# Patient Record
Sex: MALE | Race: BLACK OR AFRICAN AMERICAN | Employment: OTHER | URBAN - METROPOLITAN AREA
[De-identification: names, ages, dates, MRNs, and addresses within clinical notes are randomized per-mention and may not be internally consistent; named-entity substitution may affect disease eponyms.]

---

## 2020-12-17 ENCOUNTER — OFFICE VISIT (OUTPATIENT)
Dept: GASTROENTEROLOGY | Age: 70
End: 2020-12-17
Payer: MEDICARE

## 2020-12-17 VITALS
BODY MASS INDEX: 35.84 KG/M2 | DIASTOLIC BLOOD PRESSURE: 80 MMHG | HEIGHT: 71 IN | RESPIRATION RATE: 16 BRPM | WEIGHT: 256 LBS | HEART RATE: 66 BPM | SYSTOLIC BLOOD PRESSURE: 140 MMHG | OXYGEN SATURATION: 98 %

## 2020-12-17 DIAGNOSIS — K62.5 RECTAL BLEEDING: ICD-10-CM

## 2020-12-17 DIAGNOSIS — Z86.19 H/O SEPSIS: ICD-10-CM

## 2020-12-17 DIAGNOSIS — E66.01 SEVERE OBESITY (HCC): ICD-10-CM

## 2020-12-17 DIAGNOSIS — K27.9 PEPTIC ULCER DISEASE: ICD-10-CM

## 2020-12-17 DIAGNOSIS — K64.1 GRADE II INTERNAL HEMORRHOIDS: ICD-10-CM

## 2020-12-17 DIAGNOSIS — Z87.438 HISTORY OF BPH: ICD-10-CM

## 2020-12-17 DIAGNOSIS — K62.5 RECTAL BLEEDING: Primary | ICD-10-CM

## 2020-12-17 PROBLEM — Z87.11 HISTORY OF STOMACH ULCERS: Status: ACTIVE | Noted: 2020-12-17

## 2020-12-17 PROBLEM — K64.9 HEMORRHOIDS: Status: ACTIVE | Noted: 2020-12-17

## 2020-12-17 PROBLEM — I10 HTN (HYPERTENSION): Status: ACTIVE | Noted: 2020-12-17

## 2020-12-17 PROCEDURE — 99204 OFFICE O/P NEW MOD 45 MIN: CPT | Performed by: INTERNAL MEDICINE

## 2020-12-17 RX ORDER — OMEPRAZOLE 40 MG/1
40 CAPSULE, DELAYED RELEASE ORAL DAILY
COMMUNITY

## 2020-12-17 RX ORDER — TADALAFIL 20 MG/1
TABLET ORAL
COMMUNITY
Start: 2020-10-15

## 2020-12-17 RX ORDER — GLUCOSAMINE SULFATE 1500 MG
1000 POWDER IN PACKET (EA) ORAL
COMMUNITY

## 2020-12-17 RX ORDER — HYDROCORTISONE 25 MG/G
CREAM TOPICAL
Qty: 30 G | Refills: 3 | Status: SHIPPED | OUTPATIENT
Start: 2020-12-17 | End: 2021-04-26 | Stop reason: SDUPTHER

## 2020-12-17 NOTE — PROGRESS NOTES
1. Have you been to the ER, urgent care clinic since your last visit? Hospitalized since your last visit? NO    2. Have you seen or consulted any other health care providers outside of the [address] [place] [address] since your last visit? Include any pap smears or colon screening.  NO.

## 2020-12-17 NOTE — PROGRESS NOTES
Nancy Wilcox is a 72 y.o. male who presents today for the following:  Chief Complaint   Patient presents with    Rectal Bleeding    Hemorrhoids         No Known Allergies    Current Outpatient Medications   Medication Sig    tadalafiL (CIALIS) 20 mg tablet TAKE ONE TABLET BY MOUTH ONCE DAILY AS NEEDED for ED    omeprazole (PRILOSEC) 40 mg capsule Take 40 mg by mouth daily.  hydrocortisone (ANUSOL-HC) 2.5 % rectal cream 1 application per rectum twice daily    GARLIC PO Take  by mouth daily as needed.  cholecalciferol (VITAMIN D3) 25 mcg (1,000 unit) cap Take 1,000 Units by mouth. No current facility-administered medications for this visit.         Past Medical History:   Diagnosis Date    H/O sepsis 12/17/2020    H/o sepsis due to UTI    Hemorrhoids 12/17/2020    History of BPH 12/17/2020    History of stomach ulcers 12/17/2020    HTN (hypertension) 12/17/2020    Rectal bleeding 12/17/2020       Past Surgical History:   Procedure Laterality Date    HX OTHER SURGICAL         Family History   Problem Relation Age of Onset    Stroke Mother     Prostate Cancer Mother     Heart Disease Mother        Social History     Socioeconomic History    Marital status:      Spouse name: Not on file    Number of children: Not on file    Years of education: Not on file    Highest education level: Not on file   Occupational History    Not on file   Social Needs    Financial resource strain: Not on file    Food insecurity     Worry: Not on file     Inability: Not on file    Transportation needs     Medical: Not on file     Non-medical: Not on file   Tobacco Use    Smoking status: Unknown If Ever Smoked   Substance and Sexual Activity    Alcohol use: Not on file    Drug use: Not on file    Sexual activity: Not on file   Lifestyle    Physical activity     Days per week: Not on file     Minutes per session: Not on file    Stress: Not on file   Relationships    Social connections     Talks on phone: Not on file     Gets together: Not on file     Attends Voodoo service: Not on file     Active member of club or organization: Not on file     Attends meetings of clubs or organizations: Not on file     Relationship status: Not on file    Intimate partner violence     Fear of current or ex partner: Not on file     Emotionally abused: Not on file     Physically abused: Not on file     Forced sexual activity: Not on file   Other Topics Concern    Not on file   Social History Narrative    Not on file         HPI  46-year-old male with history of hypertension, ulcer disease, BPH, and internal hemorrhoids who comes in for evaluation of rectal bleeding. Patient states he started to have rectal bleeding 1 week ago. He states his stools are hard prior to the episode and he did do some straining. Blood has been bright red blood and with every bowel movement since that time. He states it all ended with no bleeding noted today for the first time. Associated abdominal pain except mild pain across the lower abdomen x1 episode. States his bowel movements have been more constipated recently. Patient states in November 2020 he did have a prostate biopsy. He last had a colonoscopy approximately 1 year ago and told it was okay except for internal hemorrhoids. Patient states he may see a little blood on wiping at times even prior to this episode. Patient states that last evening he use some of his sisters hydrocortisone cream per rectum. He states it burns a little at first however he has not seen any blood since that time. Patient states he had blood work done PCP approximately 1 week ago was told his blood count was good. Dizziness or weakness. Patient states his primary care doctor he is at [facility] at the [facility] and his doctors is Dr. Denise Bobby. Review of Systems   Constitutional: Negative. HENT: Negative. Negative for nosebleeds. Eyes: Negative. Respiratory: Negative. Cardiovascular: Negative. Gastrointestinal: Positive for blood in stool and constipation. Negative for abdominal pain, diarrhea, heartburn, melena, nausea and vomiting. Genitourinary: Negative. Musculoskeletal: Negative. Skin: Negative. Neurological: Negative. Endo/Heme/Allergies: Negative. Psychiatric/Behavioral: Negative. All other systems reviewed and are negative. Visit Vitals  BP (!) 140/80 (BP 1 Location: Left arm, BP Patient Position: Sitting)   Pulse 66   Resp 16   Ht 5' 11\" (1.803 m)   Wt 116.1 kg (256 lb)   SpO2 98% Comment: room air   BMI 35.70 kg/m²     Physical Exam  Vitals signs and nursing note reviewed. Constitutional:       General: He is not in acute distress. Appearance: Normal appearance. He is obese. He is not ill-appearing. HENT:      Head: Normocephalic and atraumatic. Nose: Nose normal.      Mouth/Throat:      Mouth: Mucous membranes are moist.      Pharynx: Oropharynx is clear. Eyes:      General: No scleral icterus. Extraocular Movements: Extraocular movements intact. Conjunctiva/sclera: Conjunctivae normal.      Pupils: Pupils are equal, round, and reactive to light. Neck:      Musculoskeletal: Normal range of motion and neck supple. Cardiovascular:      Rate and Rhythm: Normal rate and regular rhythm. Heart sounds: Normal heart sounds. Pulmonary:      Effort: Pulmonary effort is normal.      Breath sounds: Normal breath sounds. Abdominal:      General: Bowel sounds are normal. There is no distension. Palpations: Abdomen is soft. There is no mass. Tenderness: There is no abdominal tenderness. There is no right CVA tenderness, left CVA tenderness, guarding or rebound. Hernia: No hernia is present. Musculoskeletal: Normal range of motion. Skin:     General: Skin is warm and dry. Coloration: Skin is not jaundiced. Neurological:      General: No focal deficit present.       Mental Status: He is alert and oriented to person, place, and time. Psychiatric:         Mood and Affect: Mood normal.         Behavior: Behavior normal.         Thought Content: Thought content normal.         Judgment: Judgment normal.            1. Rectal bleeding  . Secondary to internal hemorrhoids with prolapse and bleeding. We will continue patient on a hydrocortisone cream since he responded distal 1 dose borrowed from his sister. Advised to keep stools soft by use of a stool softener daily. - hydrocortisone (ANUSOL-HC) 2.5 % rectal cream; 1 application per rectum twice daily  Dispense: 30 g; Refill: 3    2. Severe obesity ([address]      3.  Grade IIl internal hemorrhoids

## 2021-03-07 ENCOUNTER — HOSPITAL ENCOUNTER (EMERGENCY)
Age: 71
Discharge: HOME | End: 2021-03-07
Attending: EMERGENCY MEDICINE
Payer: MEDICARE

## 2021-03-07 VITALS
OXYGEN SATURATION: 96 % | BODY MASS INDEX: 35.84 KG/M2 | TEMPERATURE: 99.2 F | HEIGHT: 71 IN | WEIGHT: 256 LBS | SYSTOLIC BLOOD PRESSURE: 156 MMHG | DIASTOLIC BLOOD PRESSURE: 94 MMHG | HEART RATE: 91 BPM | RESPIRATION RATE: 18 BRPM

## 2021-03-07 DIAGNOSIS — J02.9 ACUTE PHARYNGITIS, UNSPECIFIED ETIOLOGY: Primary | ICD-10-CM

## 2021-03-07 DIAGNOSIS — H61.21 IMPACTED CERUMEN OF RIGHT EAR: ICD-10-CM

## 2021-03-07 LAB — DEPRECATED S PYO AG THROAT QL EIA: NEGATIVE

## 2021-03-07 PROCEDURE — 87070 CULTURE OTHR SPECIMN AEROBIC: CPT

## 2021-03-07 PROCEDURE — 99283 EMERGENCY DEPT VISIT LOW MDM: CPT

## 2021-03-07 PROCEDURE — 87880 STREP A ASSAY W/OPTIC: CPT

## 2021-03-07 PROCEDURE — 74011250637 HC RX REV CODE- 250/637: Performed by: EMERGENCY MEDICINE

## 2021-03-07 RX ORDER — ONDANSETRON 4 MG/1
4 TABLET, ORALLY DISINTEGRATING ORAL
Qty: 6 TAB | Refills: 0 | Status: SHIPPED | OUTPATIENT
Start: 2021-03-07

## 2021-03-07 RX ORDER — AMOXICILLIN 500 MG/1
500 TABLET, FILM COATED ORAL 3 TIMES DAILY
Qty: 30 TAB | Refills: 0 | Status: SHIPPED | OUTPATIENT
Start: 2021-03-07 | End: 2021-03-17

## 2021-03-07 RX ORDER — AMOXICILLIN 250 MG/5ML
500 POWDER, FOR SUSPENSION ORAL
Status: COMPLETED | OUTPATIENT
Start: 2021-03-07 | End: 2021-03-07

## 2021-03-07 RX ADMIN — AMOXICILLIN 500 MG: 250 POWDER, FOR SUSPENSION ORAL at 08:24

## 2021-03-07 NOTE — ED PROVIDER NOTES
EMERGENCY DEPARTMENT HISTORY AND PHYSICAL EXAM  ?    Date: 3/7/2021  Patient Name: Jesu Magana    History of Presenting Illness    Patient presents with:  Ear Pain  Sore Throat      History Provided By: Patient    HPI: Jesu Magana, 72 y.o. male with a past medical history significant hypertension presents to the ED with cc of sore throat and fever for 2 days. He had Covid 1 week ago. He also has right earache. There are no other complaints, changes, or physical findings at this time. PCP: UNKNOWN    No current facility-administered medications on file prior to encounter. Current Outpatient Medications on File Prior to Encounter:    GARLIC PO, Take  by mouth daily as needed. , Disp: , Rfl:     cholecalciferol (VITAMIN D3) 25 mcg (1,000 unit) cap, Take 1,000 Units by mouth., Disp: , Rfl:     tadalafiL (CIALIS) 20 mg tablet, TAKE ONE TABLET BY MOUTH ONCE DAILY AS NEEDED for ED, Disp: , Rfl:     omeprazole (PRILOSEC) 40 mg capsule, Take 40 mg by mouth daily. , Disp: , Rfl:     hydrocortisone (ANUSOL-HC) 2.5 % rectal cream, 1 application per rectum twice daily, Disp: 30 g, Rfl: 3        Past History    Past Medical History:  Past Medical History:  12/17/2020: H/O sepsis     Comment:  H/o sepsis due to UTI  12/17/2020: Hemorrhoids  12/17/2020: History of BPH  12/17/2020:  History of stomach ulcers  12/17/2020: HTN (hypertension)  12/17/2020: Rectal bleeding    Past Surgical History:  Past Surgical History:  No date: HX OTHER SURGICAL    Family History:  Review of patient's family history indicates:  Problem: Stroke     Relation: Mother         Age of Onset: (Not Specified)  Problem: Prostate Cancer     Relation: Mother         Age of Onset: (Not Specified)  Problem: Heart Disease     Relation: Mother         Age of Onset: (Not Specified)      Social History:  Social History   Tobacco Use     Smoking status: Unknown If Ever Smoked   Alcohol use: Not on file   Drug use: Not on file      Allergies:  No Known Allergies      Review of Systems  @[facility]@    Physical Exam  [unfilled]    Diagnostic Study Results    Labs -  No results found for this or any previous visit (from the past 12 hour(s)).    Radiologic Studies -   No orders to display  CT Results  (Last 48 hours)   None     CXR Results  (Last 48 hours)   None         Medical Decision Making  I am the first provider for this patient.    I reviewed the vital signs, available nursing notes, past medical history, past surgical history, family history and social history.    Vital Signs-Reviewed the patient's vital signs.  Empty flowsheet group.      Records Reviewed: Nursing Notes    Provider Notes (Medical Decision Making):   Patient with recent Covid infection presents with odynophagia.  Will check strep test.  It is likely related to Covid.    ED Course:   Strep test is negative.    Initial assessment performed. The patients presenting problems have been discussed, and they are in agreement with the care plan formulated and outlined with them.  I have encouraged them to ask questions as they arise throughout their visit.              PLAN:  1. Current Discharge Medication List      2. Follow-up Information    None    Return to ED if worse     Diagnosis    Clinical Impression: Pharyngitis    ?           Past Medical History:   Diagnosis Date   • H/O sepsis 12/17/2020    H/o sepsis due to UTI   • Hemorrhoids 12/17/2020   • History of BPH 12/17/2020   • History of stomach ulcers 12/17/2020   • HTN (hypertension) 12/17/2020   • Rectal bleeding 12/17/2020       Past Surgical History:   Procedure Laterality Date   • HX OTHER SURGICAL           Family History:   Problem Relation Age of Onset   • Stroke Mother    • Prostate Cancer Mother    • Heart Disease Mother        Social History     Socioeconomic History   • Marital status:      Spouse name: Not on file   • Number of children: Not on file   • Years of education: Not on file   • Highest education level:  Not on file   Occupational History    Not on file   Social Needs    Financial resource strain: Not on file    Food insecurity     Worry: Not on file     Inability: Not on file    Transportation needs     Medical: Not on file     Non-medical: Not on file   Tobacco Use    Smoking status: Unknown If Ever Smoked   Substance and Sexual Activity    Alcohol use: Not on file    Drug use: Not on file    Sexual activity: Not on file   Lifestyle    Physical activity     Days per week: Not on file     Minutes per session: Not on file    Stress: Not on file   Relationships    Social connections     Talks on phone: Not on file     Gets together: Not on file     Attends Christian service: Not on file     Active member of club or organization: Not on file     Attends meetings of clubs or organizations: Not on file     Relationship status: Not on file    Intimate partner violence     Fear of current or ex partner: Not on file     Emotionally abused: Not on file     Physically abused: Not on file     Forced sexual activity: Not on file   Other Topics Concern    Not on file   Social History Narrative    Not on file         ALLERGIES: Patient has no known allergies. Review of Systems   Constitutional: Positive for fever. HENT: Positive for ear pain and sore throat. Eyes: Negative. Respiratory: Negative. Cardiovascular: Negative. Gastrointestinal: Negative. Endocrine: Negative. Genitourinary: Negative. Musculoskeletal: Negative. Allergic/Immunologic: Negative. Neurological: Negative. Hematological: Negative. Psychiatric/Behavioral: Negative. Vitals:    03/07/21 0614   BP: (!) 151/91   Pulse: 92   Resp: 16   Temp: 99.2 °F (37.3 °C)   SpO2: 97%   Weight: 116.1 kg (256 lb)   Height: 5' 11\" (1.803 m)            Physical Exam  Vitals signs and nursing note reviewed. Constitutional:       Appearance: Normal appearance. He is normal weight.    HENT:      Head: Normocephalic and atraumatic. Right Ear: External ear normal.      Left Ear: Tympanic membrane, ear canal and external ear normal.      Ears:      Comments: Cerumen impaction     Nose: Nose normal.      Mouth/Throat:      Mouth: Mucous membranes are moist.      Pharynx: Posterior oropharyngeal erythema present. Eyes:      Extraocular Movements: Extraocular movements intact. Conjunctiva/sclera: Conjunctivae normal.      Pupils: Pupils are equal, round, and reactive to light. Neck:      Musculoskeletal: Normal range of motion and neck supple. Cardiovascular:      Rate and Rhythm: Normal rate and regular rhythm. Pulses: Normal pulses. Heart sounds: Normal heart sounds. Pulmonary:      Effort: Pulmonary effort is normal.      Breath sounds: Normal breath sounds. Abdominal:      General: Abdomen is flat. Bowel sounds are normal.      Palpations: Abdomen is soft. Musculoskeletal: Normal range of motion. Skin:     General: Skin is warm and dry. Neurological:      General: No focal deficit present. Mental Status: He is alert and oriented to person, place, and time.    Psychiatric:         Mood and Affect: Mood normal.         Behavior: Behavior normal.          MDM  Number of Diagnoses or Management Options  Risk of Complications, Morbidity, and/or Mortality  Presenting problems: low  Diagnostic procedures: low  Management options: low    Patient Progress  Patient progress: stable         Procedures

## 2021-04-26 ENCOUNTER — OFFICE VISIT (OUTPATIENT)
Dept: GASTROENTEROLOGY | Age: 71
End: 2021-04-26
Payer: MEDICARE

## 2021-04-26 VITALS
SYSTOLIC BLOOD PRESSURE: 162 MMHG | HEART RATE: 67 BPM | BODY MASS INDEX: 35.43 KG/M2 | RESPIRATION RATE: 18 BRPM | DIASTOLIC BLOOD PRESSURE: 76 MMHG | WEIGHT: 254 LBS | TEMPERATURE: 98.1 F | OXYGEN SATURATION: 99 %

## 2021-04-26 DIAGNOSIS — Z87.11 HISTORY OF STOMACH ULCERS: ICD-10-CM

## 2021-04-26 DIAGNOSIS — K62.5 RECTAL BLEEDING: ICD-10-CM

## 2021-04-26 DIAGNOSIS — K64.2 GRADE III HEMORRHOIDS: Primary | ICD-10-CM

## 2021-04-26 PROBLEM — J30.9 CHRONIC ALLERGIC RHINITIS: Status: ACTIVE | Noted: 2017-10-31

## 2021-04-26 PROBLEM — N52.1 ERECTILE DYSFUNCTION DUE TO DISEASES CLASSIFIED ELSEWHERE: Status: ACTIVE | Noted: 2019-07-16

## 2021-04-26 PROBLEM — N40.0 BENIGN PROSTATIC HYPERPLASIA: Status: ACTIVE | Noted: 2017-03-10

## 2021-04-26 PROCEDURE — 1101F PT FALLS ASSESS-DOCD LE1/YR: CPT | Performed by: INTERNAL MEDICINE

## 2021-04-26 PROCEDURE — G8753 SYS BP > OR = 140: HCPCS | Performed by: INTERNAL MEDICINE

## 2021-04-26 PROCEDURE — 99214 OFFICE O/P EST MOD 30 MIN: CPT | Performed by: INTERNAL MEDICINE

## 2021-04-26 PROCEDURE — 3017F COLORECTAL CA SCREEN DOC REV: CPT | Performed by: INTERNAL MEDICINE

## 2021-04-26 PROCEDURE — G8536 NO DOC ELDER MAL SCRN: HCPCS | Performed by: INTERNAL MEDICINE

## 2021-04-26 PROCEDURE — G8510 SCR DEP NEG, NO PLAN REQD: HCPCS | Performed by: INTERNAL MEDICINE

## 2021-04-26 PROCEDURE — G8417 CALC BMI ABV UP PARAM F/U: HCPCS | Performed by: INTERNAL MEDICINE

## 2021-04-26 PROCEDURE — G8754 DIAS BP LESS 90: HCPCS | Performed by: INTERNAL MEDICINE

## 2021-04-26 PROCEDURE — G8427 DOCREV CUR MEDS BY ELIG CLIN: HCPCS | Performed by: INTERNAL MEDICINE

## 2021-04-26 RX ORDER — HYDROCORTISONE 25 MG/G
CREAM TOPICAL
Qty: 30 G | Refills: 3 | Status: SHIPPED | OUTPATIENT
Start: 2021-04-26

## 2021-04-26 RX ORDER — FLUTICASONE PROPIONATE 50 MCG
SPRAY, SUSPENSION (ML) NASAL
COMMUNITY
Start: 2021-03-04

## 2021-04-26 NOTE — PROGRESS NOTES
Franky Givens is a 80 y.o. male who presents today for the following:  Chief Complaint   Patient presents with    Follow-up    Hemorrhoids    Rectal Bleeding         No Known Allergies    Current Outpatient Medications   Medication Sig    fluticasone propionate (FLONASE) 50 mcg/actuation nasal spray SPRAY TWICE INTO EACH NOSTRIL DAILY RINSE MOUTH AFTER USE    hydrocortisone (ANUSOL-HC) 2.5 % rectal cream 1 application per rectum twice daily    ondansetron (Zofran ODT) 4 mg disintegrating tablet Take 1 Tab by mouth every eight (8) hours as needed for Nausea or Vomiting.  GARLIC PO Take  by mouth daily as needed.  cholecalciferol (VITAMIN D3) 25 mcg (1,000 unit) cap Take 1,000 Units by mouth.  tadalafiL (CIALIS) 20 mg tablet TAKE ONE TABLET BY MOUTH ONCE DAILY AS NEEDED for ED    omeprazole (PRILOSEC) 40 mg capsule Take 40 mg by mouth daily. No current facility-administered medications for this visit.         Past Medical History:   Diagnosis Date    H/O sepsis 12/17/2020    H/o sepsis due to UTI    Hemorrhoids 12/17/2020    History of BPH 12/17/2020    History of stomach ulcers 12/17/2020    HTN (hypertension) 12/17/2020    Rectal bleeding 12/17/2020       Past Surgical History:   Procedure Laterality Date    HX OTHER SURGICAL         Family History   Problem Relation Age of Onset    Stroke Mother     Prostate Cancer Mother     Heart Disease Mother        Social History     Socioeconomic History    Marital status:      Spouse name: Not on file    Number of children: Not on file    Years of education: Not on file    Highest education level: Not on file   Occupational History    Not on file   Social Needs    Financial resource strain: Not on file    Food insecurity     Worry: Not on file     Inability: Not on file    Transportation needs     Medical: Not on file     Non-medical: Not on file   Tobacco Use    Smoking status: Unknown If Ever Smoked   Substance and Sexual Activity    Alcohol use: Not on file    Drug use: Not on file    Sexual activity: Not on file   Lifestyle    Physical activity     Days per week: Not on file     Minutes per session: Not on file    Stress: Not on file   Relationships    Social connections     Talks on phone: Not on file     Gets together: Not on file     Attends Church service: Not on file     Active member of club or organization: Not on file     Attends meetings of clubs or organizations: Not on file     Relationship status: Not on file    Intimate partner violence     Fear of current or ex partner: Not on file     Emotionally abused: Not on file     Physically abused: Not on file     Forced sexual activity: Not on file   Other Topics Concern    Not on file   Social History Narrative    Not on file         HPI  73-year-old male with history of hypertension, peptic ulcer disease, BPH, and internal hemorrhoids who comes in for follow-up visit. Patient states he is not seen anymore rectal bleeding. He states his bowel movements are moving okay most of the time. He does take a stool softener every morning which helps a lot. No abdominal pain. He states he urinates more of the hemorrhoidal cream but sent to a different pharmacy. Review of Systems   Constitutional: Negative. HENT: Negative. Negative for nosebleeds. Eyes: Negative. Respiratory: Negative. Cardiovascular: Negative. Gastrointestinal: Positive for blood in stool and constipation. Negative for abdominal pain, diarrhea, heartburn, melena, nausea and vomiting. Genitourinary: Negative. Musculoskeletal: Negative. Skin: Negative. Neurological: Negative. Endo/Heme/Allergies: Negative. Psychiatric/Behavioral: Negative. All other systems reviewed and are negative.         Visit Vitals  BP (!) 162/76 (BP 1 Location: Left upper arm, BP Patient Position: Sitting, BP Cuff Size: Adult)   Pulse 67   Temp 98.1 °F (36.7 °C) (Oral)   Resp 18   Wt 115.2 kg (254 lb)   SpO2 99%   BMI 35.43 kg/m²     Physical Exam  Vitals signs and nursing note reviewed. Constitutional:       General: He is not in acute distress. Appearance: Normal appearance. He is obese. He is not ill-appearing. HENT:      Head: Normocephalic and atraumatic. Nose: Nose normal.      Mouth/Throat:      Mouth: Mucous membranes are moist.      Pharynx: Oropharynx is clear. Eyes:      General: No scleral icterus. Extraocular Movements: Extraocular movements intact. Conjunctiva/sclera: Conjunctivae normal.      Pupils: Pupils are equal, round, and reactive to light. Neck:      Musculoskeletal: Normal range of motion and neck supple. Cardiovascular:      Rate and Rhythm: Normal rate and regular rhythm. Heart sounds: Normal heart sounds. Pulmonary:      Effort: Pulmonary effort is normal.      Breath sounds: Normal breath sounds. Abdominal:      General: Bowel sounds are normal. There is no distension. Palpations: Abdomen is soft. There is no mass. Tenderness: There is no abdominal tenderness. There is no right CVA tenderness, left CVA tenderness, guarding or rebound. Hernia: No hernia is present. Musculoskeletal: Normal range of motion. Skin:     General: Skin is warm and dry. Coloration: Skin is not jaundiced. Neurological:      General: No focal deficit present. Mental Status: He is alert and oriented to person, place, and time. Psychiatric:         Mood and Affect: Mood normal.         Behavior: Behavior normal.         Thought Content: Thought content normal.         Judgment: Judgment normal.            1. Rectal bleeding  . Secondary to inflamed internal hemorrhoids. - hydrocortisone (ANUSOL-HC) 2.5 % rectal cream; 1 application per rectum twice daily  Dispense: 30 g; Refill: 3    2. Grade III hemorrhoids      3.  History of stomach ulcers

## 2022-03-18 PROBLEM — Z86.19 H/O SEPSIS: Status: ACTIVE | Noted: 2020-12-17

## 2022-03-18 PROBLEM — N52.1 ERECTILE DYSFUNCTION DUE TO DISEASES CLASSIFIED ELSEWHERE: Status: ACTIVE | Noted: 2019-07-16

## 2022-03-18 PROBLEM — J30.9 CHRONIC ALLERGIC RHINITIS: Status: ACTIVE | Noted: 2017-10-31

## 2022-03-19 PROBLEM — K62.5 RECTAL BLEEDING: Status: ACTIVE | Noted: 2020-12-17

## 2022-03-19 PROBLEM — I10 HTN (HYPERTENSION): Status: ACTIVE | Noted: 2020-12-17

## 2022-03-19 PROBLEM — Z87.438 HISTORY OF BPH: Status: ACTIVE | Noted: 2020-12-17

## 2022-03-19 PROBLEM — K64.9 HEMORRHOIDS: Status: ACTIVE | Noted: 2020-12-17

## 2022-03-19 PROBLEM — N40.0 BENIGN PROSTATIC HYPERPLASIA: Status: ACTIVE | Noted: 2017-03-10

## 2022-03-20 PROBLEM — E66.01 SEVERE OBESITY (HCC): Status: ACTIVE | Noted: 2020-12-17

## 2022-03-20 PROBLEM — Z87.11 HISTORY OF STOMACH ULCERS: Status: ACTIVE | Noted: 2020-12-17

## 2022-07-27 ENCOUNTER — TELEPHONE (OUTPATIENT)
Dept: GASTROENTEROLOGY | Age: 72
End: 2022-07-27

## 2022-07-27 NOTE — TELEPHONE ENCOUNTER
Patient called states he thinks his stomach ulcers are flaring up wants a call back to discuss what can be done until his appt on 8/8/2022 @ 10am

## 2022-07-27 NOTE — TELEPHONE ENCOUNTER
I called patient, he said his stomach has been hurting. He ate some spam the other day and it hurt. He said he was taking the Omeprazole. I explained to avoid fried and highly seasoned food. He said he was going to try mylanta and tums until he comes for his appointment.

## 2022-07-29 ENCOUNTER — TELEPHONE (OUTPATIENT)
Dept: GASTROENTEROLOGY | Age: 72
End: 2022-07-29

## 2022-07-29 PROBLEM — K27.9 PEPTIC ULCER DISEASE: Status: ACTIVE | Noted: 2022-07-29

## 2022-07-29 NOTE — TELEPHONE ENCOUNTER
Mr Viktoriya Huizar called stating he was having some rectal bleeding, he os not sure if its from his hemrriods or not. States its only when he goes to bathroom for bowel movement, states it is bright red. Advised him to see his PCP or go to ER if the bleeding presist. Eloisa Whiteta him to report to ER if you felt faint,dizziness, CP. Patient advised understanding.

## 2022-07-31 ENCOUNTER — HOSPITAL ENCOUNTER (EMERGENCY)
Age: 72
Discharge: HOME | End: 2022-07-31
Attending: EMERGENCY MEDICINE | Admitting: EMERGENCY MEDICINE
Payer: COMMERCIAL

## 2022-07-31 VITALS
OXYGEN SATURATION: 97 % | BODY MASS INDEX: 35 KG/M2 | DIASTOLIC BLOOD PRESSURE: 96 MMHG | HEART RATE: 78 BPM | HEIGHT: 71 IN | SYSTOLIC BLOOD PRESSURE: 158 MMHG | WEIGHT: 250 LBS | TEMPERATURE: 97.7 F | RESPIRATION RATE: 20 BRPM

## 2022-07-31 DIAGNOSIS — K62.5 RECTAL BLEEDING: Primary | ICD-10-CM

## 2022-07-31 DIAGNOSIS — K64.8 BLEEDING INTERNAL HEMORRHOIDS: ICD-10-CM

## 2022-07-31 LAB
ANION GAP SERPL CALC-SCNC: 5 MMOL/L (ref 5–15)
BASOPHILS # BLD: 0.1 K/UL (ref 0–0.2)
BASOPHILS NFR BLD: 1 % (ref 0–2.5)
BUN SERPL-MCNC: 11 MG/DL (ref 6–20)
BUN/CREAT SERPL: 10 (ref 12–20)
CA-I BLD-MCNC: 8.7 MG/DL (ref 8.5–10.1)
CHLORIDE SERPL-SCNC: 105 MMOL/L (ref 97–108)
CO2 SERPL-SCNC: 29 MMOL/L (ref 21–32)
CREAT SERPL-MCNC: 1.1 MG/DL (ref 0.7–1.3)
EOSINOPHIL # BLD: 0.1 K/UL (ref 0–0.7)
EOSINOPHIL NFR BLD: 2 % (ref 0.9–2.9)
ERYTHROCYTE [DISTWIDTH] IN BLOOD BY AUTOMATED COUNT: 13.8 % (ref 11.5–14.5)
GLUCOSE SERPL-MCNC: 103 MG/DL (ref 65–100)
HCT VFR BLD AUTO: 36.7 % (ref 41–53)
HGB BLD-MCNC: 11.8 G/DL (ref 13.5–17.5)
LYMPHOCYTES # BLD: 1.6 K/UL (ref 1–4.8)
LYMPHOCYTES NFR BLD: 24 % (ref 20.5–51.1)
MCH RBC QN AUTO: 27.6 PG (ref 31–34)
MCHC RBC AUTO-ENTMCNC: 32.3 G/DL (ref 31–36)
MCV RBC AUTO: 85.6 FL (ref 80–100)
MONOCYTES # BLD: 0.6 K/UL (ref 0.2–2.4)
MONOCYTES NFR BLD: 9 % (ref 1.7–9.3)
NEUTS SEG # BLD: 4.2 K/UL (ref 1.8–7.7)
NEUTS SEG NFR BLD: 64 % (ref 42–75)
NRBC # BLD: 0.01 K/UL
NRBC BLD-RTO: 0.1 PER 100 WBC
PLATELET # BLD AUTO: 271 K/UL (ref 150–400)
PMV BLD AUTO: 7.5 FL (ref 6.5–11.5)
POTASSIUM SERPL-SCNC: 3.8 MMOL/L (ref 3.5–5.1)
RBC # BLD AUTO: 4.28 M/UL (ref 4.5–5.9)
SODIUM SERPL-SCNC: 139 MMOL/L (ref 136–145)
WBC # BLD AUTO: 6.6 K/UL (ref 4.4–11.3)

## 2022-07-31 PROCEDURE — 99283 EMERGENCY DEPT VISIT LOW MDM: CPT

## 2022-07-31 PROCEDURE — 85025 COMPLETE CBC W/AUTO DIFF WBC: CPT

## 2022-07-31 PROCEDURE — 36415 COLL VENOUS BLD VENIPUNCTURE: CPT

## 2022-07-31 PROCEDURE — 80048 BASIC METABOLIC PNL TOTAL CA: CPT

## 2022-07-31 RX ORDER — HYDROCORTISONE ACETATE 25 MG/1
25 SUPPOSITORY RECTAL EVERY 12 HOURS
Qty: 24 SUPPOSITORY | Refills: 0 | Status: SHIPPED | OUTPATIENT
Start: 2022-07-31 | End: 2022-07-31

## 2022-07-31 RX ORDER — HYDROCORTISONE ACETATE 25 MG/1
25 SUPPOSITORY RECTAL EVERY 12 HOURS
Qty: 24 SUPPOSITORY | Refills: 0 | Status: SHIPPED | OUTPATIENT
Start: 2022-07-31

## 2022-07-31 NOTE — ED PROVIDER NOTES
HPI 67-year-old male presents ED with ongoing rectal bleeding for the past 6 days. Bright red blood with bowel movement. No rectal pain denies fever nausea vomiting diarrhea. Known internal hemorrhoids previous rectal bleeding attributed to this. Colonoscopy February 2022 showing internal hemorrhoids and diverticuli but no other lesions. Patient also had proton beam radiation for prostate cancer last month finished June 28. No complications from this procedure apparently had a silicon radiation shield inserted by radiologist to avoid radiating the rectal area this history per patient.   No urinary symptoms    Past Medical History:   Diagnosis Date    H/O sepsis 12/17/2020    H/o sepsis due to UTI    Hemorrhoids 12/17/2020    History of BPH 12/17/2020    History of stomach ulcers 12/17/2020    HTN (hypertension) 12/17/2020    Peptic ulcer disease 7/29/2022    Rectal bleeding 12/17/2020       Past Surgical History:   Procedure Laterality Date    HX OTHER SURGICAL           Family History:   Problem Relation Age of Onset    Stroke Mother     Prostate Cancer Mother     Heart Disease Mother        Social History     Socioeconomic History    Marital status:      Spouse name: Not on file    Number of children: Not on file    Years of education: Not on file    Highest education level: Not on file   Occupational History    Not on file   Tobacco Use    Smoking status: Unknown    Smokeless tobacco: Not on file   Substance and Sexual Activity    Alcohol use: Not on file    Drug use: Not on file    Sexual activity: Not on file   Other Topics Concern    Not on file   Social History Narrative    Not on file     Social Determinants of Health     Financial Resource Strain: Not on file   Food Insecurity: Not on file   Transportation Needs: Not on file   Physical Activity: Not on file   Stress: Not on file   Social Connections: Not on file   Intimate Partner Violence: Not on file   Housing Stability: Not on file ALLERGIES: Patient has no known allergies. Review of Systems   Constitutional:  Negative for appetite change, chills, diaphoresis and fever. HENT:  Negative for ear pain, facial swelling, sinus pain and trouble swallowing. Eyes:  Negative for redness and visual disturbance. Respiratory:  Negative for cough, choking, chest tightness, shortness of breath, wheezing and stridor. Cardiovascular:  Negative for chest pain, palpitations and leg swelling. Gastrointestinal:  Positive for anal bleeding. Negative for abdominal distention, abdominal pain, blood in stool, constipation, diarrhea, nausea, rectal pain and vomiting. Endocrine: Negative for polydipsia and polyuria. Genitourinary:  Negative for difficulty urinating, dysuria, flank pain, frequency, hematuria and urgency. Musculoskeletal: Negative. Allergic/Immunologic: Negative. Neurological: Negative. Psychiatric/Behavioral: Negative. Vitals:    07/31/22 1221   BP: (!) 158/96   Pulse: 78   Resp: 20   Temp: 97.7 °F (36.5 °C)   SpO2: 97%   Weight: 113.4 kg (250 lb)   Height: 5' 11\" (1.803 m)            Physical Exam  Vitals and nursing note reviewed. Constitutional:       General: He is not in acute distress. Appearance: Normal appearance. He is not ill-appearing, toxic-appearing or diaphoretic. HENT:      Head: Normocephalic and atraumatic. Nose: Nose normal.      Mouth/Throat:      Mouth: Mucous membranes are moist.   Eyes:      Extraocular Movements: Extraocular movements intact. Conjunctiva/sclera: Conjunctivae normal.   Cardiovascular:      Rate and Rhythm: Normal rate and regular rhythm. Pulses: Normal pulses. Heart sounds: Normal heart sounds. No murmur heard. Pulmonary:      Effort: Pulmonary effort is normal. No respiratory distress. Breath sounds: Normal breath sounds. No wheezing, rhonchi or rales. Abdominal:      General: Bowel sounds are normal. There is no distension. Palpations: Abdomen is soft. There is no mass. Tenderness: There is no abdominal tenderness. There is no right CVA tenderness, left CVA tenderness, guarding or rebound. Hernia: No hernia is present. Genitourinary:     Penis: Normal.       Comments: Internal hemorrhoid mildly prolapsed no thrombosis minimal tenderness small amount of bright red blood on digital exam  Musculoskeletal:         General: No swelling, tenderness, deformity or signs of injury. Normal range of motion. Cervical back: Normal range of motion and neck supple. No rigidity or tenderness. Right lower leg: No edema. Left lower leg: No edema. Lymphadenopathy:      Cervical: No cervical adenopathy. Skin:     General: Skin is warm and dry. Capillary Refill: Capillary refill takes less than 2 seconds. Findings: No erythema, lesion or rash. Neurological:      General: No focal deficit present. Mental Status: He is alert and oriented to person, place, and time. Mental status is at baseline. Cranial Nerves: No cranial nerve deficit. Sensory: No sensory deficit. Psychiatric:         Mood and Affect: Mood normal.         Behavior: Behavior normal.         Thought Content: Thought content normal.        MDM    Internal hemorrhoids with some bleeding over the last 3 days hemoglobin 11.8 last value available in records was 12.6 in 2020 patient is hemodynamically stable. Will recommend Anusol HC suppositories prompt follow-up given Dr. Tonja Song general surgery.   Instructed return for worse bleeding dizziness lightheadedness abdominal pain fever    Procedures

## 2022-07-31 NOTE — ED TRIAGE NOTES
Pt reports rectal bleeding after every bowel movement since Tuesday. Pt states similar episode in the past and was told he had bleeding hemorrhoids. Pt called Dr. Katy Hatchet, his GI specialist, who told him to take maalox and scheduled an appointment on August 8th. Pt reports he continues to have blood with each bowel movement.

## 2022-11-01 PROBLEM — J34.3 HYPERTROPHY OF NASAL TURBINATES: Status: ACTIVE | Noted: 2022-05-11

## 2022-11-01 PROBLEM — H61.21 IMPACTED CERUMEN OF RIGHT EAR: Status: ACTIVE | Noted: 2022-05-11

## 2022-11-01 PROBLEM — R97.20 ELEVATED PSA: Status: ACTIVE | Noted: 2021-06-29

## 2022-11-01 PROBLEM — C61 PROSTATE CANCER (HCC): Status: ACTIVE | Noted: 2022-04-26

## 2023-04-25 DIAGNOSIS — K62.5 RECTAL BLEEDING: ICD-10-CM

## 2023-05-02 RX ORDER — HYDROCORTISONE 25 MG/G
CREAM TOPICAL
Qty: 30 G | Refills: 3 | Status: SHIPPED | OUTPATIENT
Start: 2023-05-02

## 2023-09-21 ENCOUNTER — OFFICE VISIT (OUTPATIENT)
Age: 73
End: 2023-09-21
Payer: MEDICARE

## 2023-09-21 VITALS
HEIGHT: 71 IN | WEIGHT: 252 LBS | HEART RATE: 64 BPM | DIASTOLIC BLOOD PRESSURE: 70 MMHG | OXYGEN SATURATION: 96 % | BODY MASS INDEX: 35.28 KG/M2 | RESPIRATION RATE: 18 BRPM | TEMPERATURE: 98.3 F | SYSTOLIC BLOOD PRESSURE: 148 MMHG

## 2023-09-21 DIAGNOSIS — R10.13 EPIGASTRIC PAIN: Primary | ICD-10-CM

## 2023-09-21 DIAGNOSIS — K57.30 DIVERTICULAR DISEASE OF COLON: ICD-10-CM

## 2023-09-21 DIAGNOSIS — R19.7 DIARRHEA OF PRESUMED INFECTIOUS ORIGIN: ICD-10-CM

## 2023-09-21 DIAGNOSIS — K62.5 RECTAL BLEEDING: ICD-10-CM

## 2023-09-21 DIAGNOSIS — C61 PROSTATE CANCER (HCC): ICD-10-CM

## 2023-09-21 DIAGNOSIS — K64.8 INTERNAL HEMORRHOIDS: ICD-10-CM

## 2023-09-21 PROCEDURE — 3078F DIAST BP <80 MM HG: CPT | Performed by: INTERNAL MEDICINE

## 2023-09-21 PROCEDURE — 99214 OFFICE O/P EST MOD 30 MIN: CPT | Performed by: INTERNAL MEDICINE

## 2023-09-21 PROCEDURE — 3077F SYST BP >= 140 MM HG: CPT | Performed by: INTERNAL MEDICINE

## 2023-09-21 PROCEDURE — 3017F COLORECTAL CA SCREEN DOC REV: CPT | Performed by: INTERNAL MEDICINE

## 2023-09-21 PROCEDURE — 1036F TOBACCO NON-USER: CPT | Performed by: INTERNAL MEDICINE

## 2023-09-21 PROCEDURE — 1123F ACP DISCUSS/DSCN MKR DOCD: CPT | Performed by: INTERNAL MEDICINE

## 2023-09-21 PROCEDURE — G8419 CALC BMI OUT NRM PARAM NOF/U: HCPCS | Performed by: INTERNAL MEDICINE

## 2023-09-21 PROCEDURE — G8427 DOCREV CUR MEDS BY ELIG CLIN: HCPCS | Performed by: INTERNAL MEDICINE

## 2023-09-21 RX ORDER — AMLODIPINE AND BENAZEPRIL HYDROCHLORIDE 10; 40 MG/1; MG/1
1 CAPSULE ORAL DAILY
COMMUNITY

## 2023-09-21 RX ORDER — METRONIDAZOLE 500 MG/1
500 TABLET ORAL 3 TIMES DAILY
Qty: 30 TABLET | Refills: 0 | Status: SHIPPED | OUTPATIENT
Start: 2023-09-21

## 2023-09-21 ASSESSMENT — PATIENT HEALTH QUESTIONNAIRE - PHQ9
1. LITTLE INTEREST OR PLEASURE IN DOING THINGS: 0
SUM OF ALL RESPONSES TO PHQ QUESTIONS 1-9: 0
SUM OF ALL RESPONSES TO PHQ QUESTIONS 1-9: 0
2. FEELING DOWN, DEPRESSED OR HOPELESS: 0
SUM OF ALL RESPONSES TO PHQ9 QUESTIONS 1 & 2: 0
SUM OF ALL RESPONSES TO PHQ QUESTIONS 1-9: 0
SUM OF ALL RESPONSES TO PHQ QUESTIONS 1-9: 0

## 2023-09-21 ASSESSMENT — ENCOUNTER SYMPTOMS
ALLERGIC/IMMUNOLOGIC NEGATIVE: 1
ABDOMINAL PAIN: 1
NAUSEA: 0
CONSTIPATION: 0
DIARRHEA: 1
ANAL BLEEDING: 0
BLOOD IN STOOL: 0
RECTAL PAIN: 0
VOMITING: 0
ABDOMINAL DISTENTION: 0

## 2023-09-21 NOTE — PROGRESS NOTES
Chief Complaint   Patient presents with    Diarrhea     States has had loose, diarrhea stools since last Wednesday. Took Pepto Bismol Sunday night and again on Monday and it seemed to constipate patient per patient. Abdominal Pain     Only had abdominal pain when he had to go to bathroom. Denies nausea, vomiting, fever or other symptoms. States this is the first time in a long time since this has happened. 1. Have you been to the ER, urgent care clinic since your last visit? Hospitalized since your last visit? No    2. Have you seen or consulted any other health care providers outside of the [address] [place] [address] since your last visit? Include any pap smears or colon screening.  No

## 2023-09-22 NOTE — PROGRESS NOTES
Radha Vega is a 68 y.o. male who presents today for the following:  Chief Complaint   Patient presents with    Diarrhea     States has had loose, diarrhea stools since last Wednesday. Took Pepto Bismol Sunday night and again on Monday and it seemed to constipate patient per patient. Abdominal Pain     Only had abdominal pain when he had to go to bathroom. Denies nausea, vomiting, fever or other symptoms. States this is the first time in a long time since this has happened. No Known Allergies    Current Outpatient Medications   Medication Sig Dispense Refill    amLODIPine-benazepril (LOTREL) 10-40 MG per capsule Take 1 capsule by mouth daily      metroNIDAZOLE (FLAGYL) 500 MG tablet Take 1 tablet by mouth 3 times daily 30 tablet 0    GARLIC PO Take by mouth daily as needed      vitamin D 25 MCG (1000 UT) CAPS Take 1 capsule by mouth      fluticasone (FLONASE) 50 MCG/ACT nasal spray SPRAY TWICE INTO EACH NOSTRIL DAILY RINSE MOUTH AFTER USE      hydrocortisone 2.5 % cream 1 application per rectum twice daily      omeprazole (PRILOSEC) 40 MG delayed release capsule Take 1 capsule by mouth daily      tadalafil (CIALIS) 20 MG tablet TAKE ONE TABLET BY MOUTH ONCE DAILY AS NEEDED for ED       No current facility-administered medications for this visit.        Past Medical History:   Diagnosis Date    H/O sepsis 12/17/2020    H/o sepsis due to UTI    Hemorrhoids 12/17/2020    History of BPH 12/17/2020    History of stomach ulcers 12/17/2020    HTN (hypertension) 12/17/2020    Peptic ulcer disease 7/29/2022    Rectal bleeding 12/17/2020       Past Surgical History:   Procedure Laterality Date    COLONOSCOPY N/A 02/04/2022    DR Andrea Emmanuel    OTHER SURGICAL HISTORY         Family History   Problem Relation Age of Onset    Prostate Cancer Mother     Stroke Mother     Heart Disease Mother        Social History     Socioeconomic History    Marital status:      Spouse name: Not on file    Number of children:

## 2024-03-08 ENCOUNTER — OFFICE VISIT (OUTPATIENT)
Age: 74
End: 2024-03-08

## 2024-03-08 ENCOUNTER — PREP FOR PROCEDURE (OUTPATIENT)
Age: 74
End: 2024-03-08

## 2024-03-08 ENCOUNTER — HOSPITAL ENCOUNTER (OUTPATIENT)
Facility: HOSPITAL | Age: 74
End: 2024-03-08
Payer: MEDICARE

## 2024-03-08 VITALS
HEART RATE: 61 BPM | SYSTOLIC BLOOD PRESSURE: 160 MMHG | HEIGHT: 71 IN | BODY MASS INDEX: 35.28 KG/M2 | OXYGEN SATURATION: 96 % | DIASTOLIC BLOOD PRESSURE: 90 MMHG | TEMPERATURE: 98 F | WEIGHT: 252 LBS | RESPIRATION RATE: 14 BRPM

## 2024-03-08 DIAGNOSIS — K62.5 HEMORRHAGE OF RECTUM AND ANUS: ICD-10-CM

## 2024-03-08 DIAGNOSIS — K62.5 RECTAL BLEEDING: Primary | ICD-10-CM

## 2024-03-08 DIAGNOSIS — K64.4 EXTERNAL HEMORRHOIDS: ICD-10-CM

## 2024-03-08 DIAGNOSIS — C61 PROSTATE CANCER (HCC): ICD-10-CM

## 2024-03-08 DIAGNOSIS — K64.2 GRADE III HEMORRHOIDS: ICD-10-CM

## 2024-03-08 DIAGNOSIS — K62.5 RECTAL BLEEDING: ICD-10-CM

## 2024-03-08 DIAGNOSIS — K57.30 DIVERTICULAR DISEASE OF COLON: ICD-10-CM

## 2024-03-08 LAB
BASOPHILS # BLD: 0 K/UL (ref 0–0.1)
BASOPHILS NFR BLD: 0 % (ref 0–1)
DIFFERENTIAL METHOD BLD: NORMAL
EOSINOPHIL # BLD: 0.2 K/UL (ref 0–0.4)
EOSINOPHIL NFR BLD: 3 % (ref 0–7)
ERYTHROCYTE [DISTWIDTH] IN BLOOD BY AUTOMATED COUNT: 12.5 % (ref 11.5–14.5)
HCT VFR BLD AUTO: 39.6 % (ref 36.6–50.3)
HGB BLD-MCNC: 12.8 G/DL (ref 12.1–17)
IMM GRANULOCYTES # BLD AUTO: 0 K/UL (ref 0–0.04)
IMM GRANULOCYTES NFR BLD AUTO: 0 % (ref 0–0.5)
LYMPHOCYTES # BLD: 2.1 K/UL (ref 0.8–3.5)
LYMPHOCYTES NFR BLD: 31 % (ref 12–49)
MCH RBC QN AUTO: 27.8 PG (ref 26–34)
MCHC RBC AUTO-ENTMCNC: 32.3 G/DL (ref 30–36.5)
MCV RBC AUTO: 86.1 FL (ref 80–99)
MONOCYTES # BLD: 0.4 K/UL (ref 0–1)
MONOCYTES NFR BLD: 7 % (ref 5–13)
NEUTS SEG # BLD: 4 K/UL (ref 1.8–8)
NEUTS SEG NFR BLD: 59 % (ref 32–75)
NRBC # BLD: 0 K/UL (ref 0–0.01)
NRBC BLD-RTO: 0 PER 100 WBC
PLATELET # BLD AUTO: 280 K/UL (ref 150–400)
PMV BLD AUTO: 9.1 FL (ref 8.9–12.9)
RBC # BLD AUTO: 4.6 M/UL (ref 4.1–5.7)
WBC # BLD AUTO: 6.7 K/UL (ref 4.1–11.1)

## 2024-03-08 PROCEDURE — 36415 COLL VENOUS BLD VENIPUNCTURE: CPT

## 2024-03-08 PROCEDURE — 85025 COMPLETE CBC W/AUTO DIFF WBC: CPT

## 2024-03-08 RX ORDER — METRONIDAZOLE 500 MG/1
500 TABLET ORAL 3 TIMES DAILY
Qty: 42 TABLET | Refills: 0 | Status: SHIPPED | OUTPATIENT
Start: 2024-03-08

## 2024-03-08 RX ORDER — POLYETHYLENE GLYCOL 3350 17 G/17G
POWDER, FOR SOLUTION ORAL
Qty: 510 G | Refills: 0 | Status: SHIPPED | OUTPATIENT
Start: 2024-03-08

## 2024-03-08 ASSESSMENT — PATIENT HEALTH QUESTIONNAIRE - PHQ9
SUM OF ALL RESPONSES TO PHQ QUESTIONS 1-9: 0
SUM OF ALL RESPONSES TO PHQ9 QUESTIONS 1 & 2: 0
SUM OF ALL RESPONSES TO PHQ QUESTIONS 1-9: 0
1. LITTLE INTEREST OR PLEASURE IN DOING THINGS: 0
SUM OF ALL RESPONSES TO PHQ QUESTIONS 1-9: 0
SUM OF ALL RESPONSES TO PHQ QUESTIONS 1-9: 0
2. FEELING DOWN, DEPRESSED OR HOPELESS: 0

## 2024-03-08 NOTE — PROGRESS NOTES
1. Have you been to the ER, urgent care clinic since your last visit?  Hospitalized since your last visit? no    2. Have you seen or consulted any other health care providers outside of the [facility] since your last visit?  Include any pap smears or colon screening.  No   Chief Complaint   Patient presents with    Follow-up    Abdominal Pain    Hemorrhoids    Rectal Bleeding     Since Wednesday morning    Nausea     BP (!) 160/90 (Site: Left Upper Arm, Position: Sitting, Cuff Size: Medium Adult)   Pulse 61   Temp 98 °F (36.7 °C) (Temporal)   Resp 14   Ht 1.803 m (5' 11\")   Wt 114.3 kg (252 lb)   SpO2 96%   BMI 35.15 kg/m²

## 2024-03-09 ASSESSMENT — ENCOUNTER SYMPTOMS
BLOOD IN STOOL: 1
ABDOMINAL DISTENTION: 0
RESPIRATORY NEGATIVE: 1
NAUSEA: 0
DIARRHEA: 0
ALLERGIC/IMMUNOLOGIC NEGATIVE: 1
CONSTIPATION: 0
HEMATOCHEZIA: 1
ANAL BLEEDING: 1
ABDOMINAL PAIN: 1
RECTAL PAIN: 0
VOMITING: 0

## 2024-03-19 ENCOUNTER — TELEPHONE (OUTPATIENT)
Age: 74
End: 2024-03-19

## 2024-03-19 ENCOUNTER — PREP FOR PROCEDURE (OUTPATIENT)
Age: 74
End: 2024-03-19

## 2025-02-21 ENCOUNTER — TELEPHONE (OUTPATIENT)
Age: 75
End: 2025-02-21

## 2025-02-21 NOTE — TELEPHONE ENCOUNTER
SPOKE WITH PT HE HAD SEVERAL CANCELLATIONS/ NO SHOWS.LAST SEEN A YEAR AGO. I ADVISED HIM THAT WE CAN SCHEDULE HIM AN APPT. SINCE HE THINKS IT IS THE SAME PROBLEM AS BEFORE. HE CAN ALSO GO TO URGENT CARE, THE ER OR HIS PCP IF HE NEEDS TO BE SEEN IMMEDIATELY. HE VERBALIZED UNDERSTANDING.   APPT HAS BEEN MADE FOR FOR HIM IN MARCH TO BEE SEEN.

## 2025-02-21 NOTE — TELEPHONE ENCOUNTER
Patient called states he is having rectal bleeding and would like to reschedule colonoscopy. Told patient he would need to be seen. States he can't wait until April for an appt.Patient would like to be called in something to help with bleeding.

## 2025-03-13 ENCOUNTER — OFFICE VISIT (OUTPATIENT)
Age: 75
End: 2025-03-13
Payer: MEDICARE

## 2025-03-13 VITALS
OXYGEN SATURATION: 98 % | HEIGHT: 71 IN | HEART RATE: 74 BPM | WEIGHT: 253 LBS | BODY MASS INDEX: 35.42 KG/M2 | SYSTOLIC BLOOD PRESSURE: 158 MMHG | RESPIRATION RATE: 18 BRPM | DIASTOLIC BLOOD PRESSURE: 77 MMHG | TEMPERATURE: 98 F

## 2025-03-13 DIAGNOSIS — K64.4 EXTERNAL HEMORRHOIDS: Primary | ICD-10-CM

## 2025-03-13 DIAGNOSIS — K57.30 DIVERTICULAR DISEASE OF COLON: ICD-10-CM

## 2025-03-13 DIAGNOSIS — K64.2 GRADE III HEMORRHOIDS: ICD-10-CM

## 2025-03-13 DIAGNOSIS — C61 PROSTATE CANCER (HCC): ICD-10-CM

## 2025-03-13 DIAGNOSIS — K59.09 CHRONIC CONSTIPATION: ICD-10-CM

## 2025-03-13 PROCEDURE — G8417 CALC BMI ABV UP PARAM F/U: HCPCS | Performed by: INTERNAL MEDICINE

## 2025-03-13 PROCEDURE — 3017F COLORECTAL CA SCREEN DOC REV: CPT | Performed by: INTERNAL MEDICINE

## 2025-03-13 PROCEDURE — 3078F DIAST BP <80 MM HG: CPT | Performed by: INTERNAL MEDICINE

## 2025-03-13 PROCEDURE — G8427 DOCREV CUR MEDS BY ELIG CLIN: HCPCS | Performed by: INTERNAL MEDICINE

## 2025-03-13 PROCEDURE — 1123F ACP DISCUSS/DSCN MKR DOCD: CPT | Performed by: INTERNAL MEDICINE

## 2025-03-13 PROCEDURE — 3077F SYST BP >= 140 MM HG: CPT | Performed by: INTERNAL MEDICINE

## 2025-03-13 PROCEDURE — 1036F TOBACCO NON-USER: CPT | Performed by: INTERNAL MEDICINE

## 2025-03-13 PROCEDURE — 99214 OFFICE O/P EST MOD 30 MIN: CPT | Performed by: INTERNAL MEDICINE

## 2025-03-13 RX ORDER — HYDROCORTISONE 25 MG/G
CREAM TOPICAL
Qty: 28 G | Refills: 3 | Status: SHIPPED | OUTPATIENT
Start: 2025-03-13

## 2025-03-13 RX ORDER — WHEAT DEXTRIN 3 G/3.8 G
4 POWDER (GRAM) ORAL
COMMUNITY
Start: 2025-03-13

## 2025-03-13 NOTE — PROGRESS NOTES
Chief Complaint   Patient presents with    Follow-up     \"Have you been to the ER, urgent care clinic since your last visit?  Hospitalized since your last visit?\"    NO    “Have you seen or consulted any other health care providers outside our system since your last visit?”    NO

## 2025-03-14 ASSESSMENT — ENCOUNTER SYMPTOMS
ANAL BLEEDING: 1
RECTAL PAIN: 1
CONSTIPATION: 1
BLOOD IN STOOL: 1
NAUSEA: 0
ALLERGIC/IMMUNOLOGIC NEGATIVE: 1
DIARRHEA: 0
VOMITING: 0
ABDOMINAL PAIN: 0
ABDOMINAL DISTENTION: 0
RESPIRATORY NEGATIVE: 1

## 2025-03-14 NOTE — PROGRESS NOTES
Rajinder Lomax is a 74 y.o. male who presents today for the following:  Chief Complaint   Patient presents with    Follow-up         No Known Allergies    Current Outpatient Medications   Medication Sig Dispense Refill    hydrocortisone 2.5 % cream Apply topically 2 times daily. 28 g 3    Wheat Dextrin (BENEFIBER) POWD Take 4 g by mouth 2 times daily (before meals)      polyethylene glycol (GLYCOLAX) 17 GM/SCOOP powder Use as directed by physician. 510 g 0    hydrocortisone 2.5 % cream 1 application per rectum twice daily 28 g 3    amLODIPine-benazepril (LOTREL) 10-40 MG per capsule Take 1 capsule by mouth daily      GARLIC PO Take by mouth daily as needed      vitamin D 25 MCG (1000 UT) CAPS Take 1 capsule by mouth      fluticasone (FLONASE) 50 MCG/ACT nasal spray SPRAY TWICE INTO EACH NOSTRIL DAILY RINSE MOUTH AFTER USE      omeprazole (PRILOSEC) 40 MG delayed release capsule Take 1 capsule by mouth daily      tadalafil (CIALIS) 20 MG tablet TAKE ONE TABLET BY MOUTH ONCE DAILY AS NEEDED for ED       No current facility-administered medications for this visit.       Past Medical History:   Diagnosis Date    H/O sepsis 12/17/2020    H/o sepsis due to UTI    Hemorrhoids 12/17/2020    History of BPH 12/17/2020    History of stomach ulcers 12/17/2020    HTN (hypertension) 12/17/2020    Peptic ulcer disease 7/29/2022    Rectal bleeding 12/17/2020       Past Surgical History:   Procedure Laterality Date    COLONOSCOPY N/A 02/04/2022    DR CORTEZ    OTHER SURGICAL HISTORY         Family History   Problem Relation Age of Onset    Prostate Cancer Mother     Stroke Mother     Heart Disease Mother        Social History     Socioeconomic History    Marital status:      Spouse name: Not on file    Number of children: Not on file    Years of education: Not on file    Highest education level: Not on file   Occupational History    Not on file   Tobacco Use    Smoking status: Never    Smokeless tobacco: Never   Vaping Use

## 2025-04-24 ENCOUNTER — OFFICE VISIT (OUTPATIENT)
Age: 75
End: 2025-04-24

## 2025-04-24 VITALS
SYSTOLIC BLOOD PRESSURE: 142 MMHG | BODY MASS INDEX: 35.42 KG/M2 | HEIGHT: 71 IN | OXYGEN SATURATION: 98 % | HEART RATE: 77 BPM | DIASTOLIC BLOOD PRESSURE: 90 MMHG | RESPIRATION RATE: 17 BRPM | WEIGHT: 253 LBS

## 2025-04-24 DIAGNOSIS — J30.9 CHRONIC ALLERGIC RHINITIS: Primary | ICD-10-CM

## 2025-04-24 DIAGNOSIS — H93.8X1 PLUGGED FEELING IN EAR, RIGHT: ICD-10-CM

## 2025-04-24 DIAGNOSIS — R04.0 EPISTAXIS: ICD-10-CM

## 2025-04-24 DIAGNOSIS — R09.82 POST-NASAL DRIP: ICD-10-CM

## 2025-04-24 DIAGNOSIS — H61.23 BILATERAL IMPACTED CERUMEN: ICD-10-CM

## 2025-04-24 RX ORDER — AZELASTINE 1 MG/ML
1 SPRAY, METERED NASAL 2 TIMES DAILY
Qty: 60 ML | Refills: 1 | Status: SHIPPED | OUTPATIENT
Start: 2025-04-24

## 2025-04-24 NOTE — PROGRESS NOTES
Otolaryngology-Head and Neck Surgery  New Patient Visit     Patient: Rajinder Lomax  YOB: 1950  MRN: [id number]  Date of Service: 4/24/2025    Chief Complaint:   Chief Complaint   Patient presents with    New Patient    Epistaxis     Clogged ear         History of Present Illness: Rajinder Lomax is a 75 y.o. male who presents today for discussion of a couple of issues.     R ear clogged for last 3-4 months   Describes a foreign body sensation  Hearing seems ok     Nosebleed 1 month ago lasted a couple of days  No further issues since then  No prior history of epistaxis  Right sided     Does have a history of nasal congestion and postnasal drip  Previously was using Flonase, stopped this and was transitioned to ipratropium nasal spray  He is not sure why and he found that Flonase was more helpful as far as his congestion and postnasal drip    Past Medical History:  Past Medical History:   Diagnosis Date    H/O sepsis 12/17/2020    H/o sepsis due to UTI    Hemorrhoids 12/17/2020    History of BPH 12/17/2020    History of stomach ulcers 12/17/2020    HTN (hypertension) 12/17/2020    Peptic ulcer disease 7/29/2022    Rectal bleeding 12/17/2020       Past Surgical History:   Past Surgical History:   Procedure Laterality Date    COLONOSCOPY N/A 02/04/2022    DR CORTEZ    OTHER SURGICAL HISTORY         Medications:   Current Outpatient Medications   Medication Instructions    amLODIPine-benazepril (LOTREL) 10-40 MG per capsule 1 capsule, DAILY    fluticasone (FLONASE) 50 MCG/ACT nasal spray SPRAY TWICE INTO EACH NOSTRIL DAILY RINSE MOUTH AFTER USE    GARLIC PO DAILY PRN    hydrocortisone 2.5 % cream 1 application per rectum twice daily    hydrocortisone 2.5 % cream Apply topically 2 times daily.    omeprazole (PRILOSEC) 40 mg, DAILY    polyethylene glycol (GLYCOLAX) 17 GM/SCOOP powder Use as directed by physician.    tadalafil (CIALIS) 20 MG tablet TAKE ONE TABLET BY MOUTH ONCE DAILY AS NEEDED for ED